# Patient Record
Sex: FEMALE | Race: WHITE | NOT HISPANIC OR LATINO | Employment: FULL TIME
[De-identification: names, ages, dates, MRNs, and addresses within clinical notes are randomized per-mention and may not be internally consistent; named-entity substitution may affect disease eponyms.]

---

## 2022-09-01 ENCOUNTER — TRANSCRIBE ORDERS (OUTPATIENT)
Dept: OTHER | Age: 52
End: 2022-09-01

## 2022-09-01 ENCOUNTER — PATIENT OUTREACH (OUTPATIENT)
Dept: RADIATION ONCOLOGY | Facility: CLINIC | Age: 52
End: 2022-09-01

## 2022-09-01 DIAGNOSIS — C50.919 BREAST CANCER (H): Primary | ICD-10-CM

## 2022-09-01 NOTE — PROGRESS NOTES
Received new patient radiation oncology consultation for pre-surgical consultation from Dr. Patel if possible.  This RN contacted patient, patient currently moving daughter into college and requests return call to patient on Friday, 9/2/2022 to assist in scheduling.    Swetha Cha, RN BSN OCN CBCN

## 2022-09-02 NOTE — TELEPHONE ENCOUNTER
Received return call from patient, patient confirms referral to radiation oncology at [facility].  Patient reports originally upon speaking with the surgical nurse, plan was for pre-surgical consultation, however, patient reports after speaking with her surgeon, she is comfortable with a post-surgical consultation and requests a visit after she has also seen Dr. Robledo in medical oncology.  Patient confirms surgical date of 9/9/2022 for right breast lumpectomy with Dr. Patel and medical oncology consultation with Dr. Robledo on 9/27/2022.  Patient scheduled for new patient radiation oncology consultation with Dr. Tulio Eller on Thursday, 9/29/2022 at 0830 with CT Simulation for radiation treatment planning scheduled at 1015.  Reviewed clinic name, address, check-in desk location and direct contact information for this RN.  Patient denies history of previous radiation treatment, denies placement of cardiac pacemaker/ICD and confirms she will contact her insurance company to verify in-network status.  Patient verbalized understanding of all information, confirmed appointment details and had no questions at this time.    Swetha Cha, RN BSN OCN CBCN

## 2022-09-20 NOTE — TELEPHONE ENCOUNTER
Chart review completed for patient, scheduled for re-excision lumpectomy with Dr. Patel on 9/30/2022.  This RN contacted patient to verify and patient confirms she will be proceeding for additional surgery.  Patient's appointment with Dr. Tulio Eller in radiation oncology currently scheduled for 9/29/2022.  Reviewed rescheduling to post-surgical date and offered appointment on 10/14/2022, 10/18/2022, 10/20/2022 or 10/21/2022.  Patient requests appointment on Tuesday, 10/18/2022 at 0945 for consultation with CT Simulation following at 1100 at [facility].  Patient confirmed appointment details and had no questions at this time.    Swetha Cha, RN BSN OCN CBCN

## 2022-10-18 ENCOUNTER — APPOINTMENT (OUTPATIENT)
Dept: RADIATION ONCOLOGY | Facility: CLINIC | Age: 52
End: 2022-10-18
Payer: COMMERCIAL

## 2022-10-18 ENCOUNTER — OFFICE VISIT (OUTPATIENT)
Dept: RADIATION ONCOLOGY | Facility: CLINIC | Age: 52
End: 2022-10-18
Payer: COMMERCIAL

## 2022-10-18 VITALS
TEMPERATURE: 98.2 F | SYSTOLIC BLOOD PRESSURE: 125 MMHG | RESPIRATION RATE: 18 BRPM | DIASTOLIC BLOOD PRESSURE: 82 MMHG | OXYGEN SATURATION: 99 % | HEART RATE: 92 BPM | WEIGHT: 183.4 LBS

## 2022-10-18 DIAGNOSIS — C50.919 BREAST CANCER (H): ICD-10-CM

## 2022-10-18 PROCEDURE — 77290 THER RAD SIMULAJ FIELD CPLX: CPT | Performed by: RADIOLOGY

## 2022-10-18 PROCEDURE — 99205 OFFICE O/P NEW HI 60 MIN: CPT | Mod: 25 | Performed by: SURGERY

## 2022-10-18 PROCEDURE — 77263 THER RADIOLOGY TX PLNG CPLX: CPT | Performed by: SURGERY

## 2022-10-18 PROCEDURE — 77332 RADIATION TREATMENT AID(S): CPT | Performed by: SURGERY

## 2022-10-18 ASSESSMENT — PAIN SCALES - GENERAL: PAINLEVEL: NO PAIN (0)

## 2022-10-18 NOTE — LETTER
10/18/2022         RE: Kelin Guerra  [address] [facility] [address]        Dear Colleague,    Thank you for referring your patient, Kelin Guerra, to the [facility] RADIATION ONCOLOGY [place]. Please see a copy of my visit note below.       Department of Radiation Oncology  [facility]: [facility] Center  [facility]  [address] [address] [place] [address]  [phone]       Consultation Note    Name: Kelin Guerra MRN: [id number]   : 1970   Date of Service: 10/18/2022  Referring: Dr. Patel     Reason for consultation: RIGHT Breast DCIS    History of Present Illness     Ms. 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 Patel, 22, re-excision for positive margin 22) with pathology demonstrating 8mm DCIS, grade 2, negative margins (initially had positive superior medial margin, and close 1mm margin posteriorly). She will receive endocrine therapy after radiation (Dr. Robledo).     Briefly, her oncologic history as follows:    Patient initially underwent a screening mammogram on 8/3/2022 which identified indeterminate calcifications with asymmetry in the right breast at the 2 o'clock position, middle depth.    He underwent a diagnostic mammogram on 2022 which demonstrated persistent cluster of calcifications measuring up to 7 mm in size at the 3 o'clock position, middle depth.  This is characterized as a BI-RADS-4 lesion.    She underwent biopsy on 2022 with pathology returning as a ductal carcinoma in situ, grade 2, ER positive.    MRI was also obtained of the breast which confirmed foci of calcifications in the right breast without any evidence of lymphadenopathy.  The left breast was negative for any abnormalities.  There was also a indeterminate 8 mm lesion in the liver near the falciform ligament and a liver MRI was recommended for follow-up.      She underwent breast  conservation on 9/9/2022 with Dr. Patel.  Pathology demonstrated ductal carcinoma in situ, measuring 8 mm in extent, grade 2, with presence of central comedonecrosis.  There was initial evidence of a positive superior medial margin with a less than 1 mm focus as well as a close 1 mm posterior margin.    She subsequently underwent reexcision on 9/30/2022.  The right superior medial margin reexcision returned negative for any residual ductal carcinoma .  The right posterior margin reexcision was also negative for any further residual ductal carcinoma in situ.    She is already met with  with plans for endocrine therapy after completion of radiation. She was advised to schedule an MRI of the liver by medical oncology which will be scheduled in the near future.    Today, patient denies any breast tenderness, pain, drainage, fevers, chills, extremity range of motion difficulties, chest pain, dyspnea, or weight loss.     Past Medical History:   Past Medical History:   Diagnosis Date     Ductal carcinoma in situ (DCIS) of right breast 08/04/2022       Past Surgical History:   Past Surgical History:   Procedure Laterality Date     ACHILLES TENDON SURGERY Left 2012     BREAST BIOPSY, CORE RT/LT Right 08/04/2022     BREAST BIOPSY, RT/LT Left 1999    Benign cyst per patient report     BREAST LUMPECTOMY, RT/LT Right 09/09/2022    Dr. Colbert - [facility]     EXTRACTION OF WISDOM TEETH       HERNIA REPAIR      age 5 per patient     HYSTERECTOMY  06/2013     RE-EXCISION LUMPECTOMY RIGHT BREAST Right     Dr. Colbert - [facility]       Chemotherapy History:  Prior chemotherapy    Radiation History:  Prior radiation    Pregnant: No, prior hysterectomy (date 2013 for fibroids and bleeding)   Implanted Cardiac Devices: No    Medications:  Current Outpatient Medications   Medication     Multiple Vitamin (MULTIVITAMIN PO)     No current facility-administered medications for this visit.         Allergies:   No Known  Allergies    Social History:  Tobacco: non smoker  Alcohol: occasional  Employment: works for title company    Family History:  Family History   Problem Relation Age of Onset     Breast Cancer Maternal Grandmother 85     Breast Cancer Maternal Grandfather 91     Ovarian Cancer No family hx of      Prostate Cancer No family hx of        Review of Systems   A 10-point review of systems was performed. Pertinent findings are noted in the HPI.    Physical Exam   ECOG Status: 1    Vitals:  /82 (BP Location: Left arm, Patient Position: Chair, Cuff Size: Adult Regular)   Pulse 92   Temp 98.2  F (36.8  C) (Oral)   Resp 18   Wt 83.2 kg (183 lb 6.4 oz)   SpO2 99%     Gen: Alert, in NAD  Head: NC/AT  Eyes: PERRL, EOMI, sclera anicteric  Ears: No external auricular lesions  Nose/sinus: No rhinorrhea or epistaxis  Breast: RIGHT breast incision healing well, incision CDI, no dehiscence, no upper extremity range of motion limitation     Oral cavity/oropharynx: MMM, no visible oral cavity lesions  Neck: Full ROM, supple, no palpable adenopathy  Pulm: No wheezing, stridor or respiratory distress  CV: Extremities are warm and well-perfused, no cyanosis, no pedal edema  Abdominal: Normal bowel sounds, soft, nontender, no masses  Musculoskeletal: Normal bulk and tone  Skin: Normal color and turgor  Neuro: A/Ox3, CN II-XII intact, normal gait    Imaging/Path/Labs   Imaging: per HPI, personally reviewed and in agreement     Path: per HPI, personally reviewed and in agreement     Labs: per HPI, personally reviewed and in agreement     Assessment      Ms. 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 Patel, 9/9/22, re-excision for positive margin 9/30/22) with pathology demonstrating 8mm DCIS, grade 2, negative margins (initially had positive superior medial margin, and close 1mm margin posteriorly with no residual on re-excision). She will receive endocrine therapy after radiation (Dr. Robledo).  "    We reviewed that the standard of care for the treatment of ductal carcinoma in-situ of the breast in the setting of breast conservation is consideration of adjuvant radiation of the breast. We discussed the etiology of the disease and the overall favorable prognosis of DCIS. In particular, we discussed the benefits of adjuvant radiation therapy would be for reduction in risk of intra-breast tumor recurrences (more DCIS or invasive carcinoma) by approximately 50% without an overall survival benefit (Luis et al, NSABP-17, NEJM 1993, JCO 1998; EBCTG [place]-analysis, [facility], 2010).     With respect to omission of radiation, we discussed the results of RTOG 9804 which randomized women with \"low risk\" DCIS, defined as mammographically detected, <2.5cm, grades 1-2, with surgical margins of at least 3mm, to whole breast irradiation versus observation. While there was an ipsilateral local recurrence benefit to radiation, risk of recurrence with observation is 11% vs 3% with radiation at 12 years.     The options of conventional fractionation versus hypofractionation were reviewed.  Potential options for omission of radiation, and the role of boost were also reviewed.     We discussed the potential acute and delayed toxicities of radiation therapy, as well as the logistics of daily radiation treatment.  The logistics of CT simulation were explained and options for set up, including supine versus prone positioning, and gating were explained.      Plan     1. After extensive discussion, patient wishes to proceed with adjuvant RIGHT whole breast radiation.     2. CT simulation today, with plan to start in 2 weeks. Plan for 40Gy/15fx without boost.     3. Patient has already met with medical oncology (Dr. Robledo) and will receive endocrine therapy after radiation.    All risks and benefits discussed and patient is in agreement with oncologic plan discussed above.     Thank you for allowing me to participate in your " patient's care.  If you should require any additional information, please do not hesitate in contacting me.        Tulio Eller MD  Department of Radiation Oncology  [facility]         Again, thank you for allowing me to participate in the care of your patient.        Sincerely,        uTlio Eller MD

## 2022-10-18 NOTE — PROGRESS NOTES
Department of Radiation Oncology  [facility]    Health: Cancer Center  [facility]  [address] [place] [address]  [phone]       Consultation Note    Name: Kelin Guerra MRN: [id number]   : 1970   Date of Service: 10/18/2022  Referring: Dr. Patel     Reason for consultation: RIGHT Breast DCIS    History of Present Illness     Ms. 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 Patel, 22, re-excision for positive margin 22) with pathology demonstrating 8mm DCIS, grade 2, negative margins (initially had positive superior medial margin, and close 1mm margin posteriorly). She will receive endocrine therapy after radiation (Dr. Robledo).     Briefly, her oncologic history as follows:    Patient initially underwent a screening mammogram on 8/3/2022 which identified indeterminate calcifications with asymmetry in the right breast at the 2 o'clock position, middle depth.    He underwent a diagnostic mammogram on 2022 which demonstrated persistent cluster of calcifications measuring up to 7 mm in size at the 3 o'clock position, middle depth.  This is characterized as a BI-RADS-4 lesion.    She underwent biopsy on 2022 with pathology returning as a ductal carcinoma in situ, grade 2, ER positive.    MRI was also obtained of the breast which confirmed foci of calcifications in the right breast without any evidence of lymphadenopathy.  The left breast was negative for any abnormalities.  There was also a indeterminate 8 mm lesion in the liver near the falciform ligament and a liver MRI was recommended for follow-up.      She underwent breast conservation on 2022 with Dr. Patel.  Pathology demonstrated ductal carcinoma in situ, measuring 8 mm in extent, grade 2, with presence of central comedonecrosis.  There was initial evidence of a positive superior medial margin with a less than 1 mm focus as well as a  close 1 mm posterior margin.    She subsequently underwent reexcision on 9/30/2022.  The right superior medial margin reexcision returned negative for any residual ductal carcinoma .  The right posterior margin reexcision was also negative for any further residual ductal carcinoma in situ.    She is already met with  with plans for endocrine therapy after completion of radiation. She was advised to schedule an MRI of the liver by medical oncology which will be scheduled in the near future.    Today, patient denies any breast tenderness, pain, drainage, fevers, chills, extremity range of motion difficulties, chest pain, dyspnea, or weight loss.     Past Medical History:   Past Medical History:   Diagnosis Date     Ductal carcinoma in situ (DCIS) of right breast 08/04/2022       Past Surgical History:   Past Surgical History:   Procedure Laterality Date     ACHILLES TENDON SURGERY Left 2012     BREAST BIOPSY, CORE RT/LT Right 08/04/2022     BREAST BIOPSY, RT/LT Left 1999    Benign cyst per patient report     BREAST LUMPECTOMY, RT/LT Right 09/09/2022    Dr. Colbert - [facility]     EXTRACTION OF WISDOM TEETH       HERNIA REPAIR      age 5 per patient     HYSTERECTOMY  06/2013     RE-EXCISION LUMPECTOMY RIGHT BREAST Right     Dr. Colbert - [facility]       Chemotherapy History:  Prior chemotherapy    Radiation History:  Prior radiation    Pregnant: No, prior hysterectomy (date 2013 for fibroids and bleeding)   Implanted Cardiac Devices: No    Medications:  Current Outpatient Medications   Medication     Multiple Vitamin (MULTIVITAMIN PO)     No current facility-administered medications for this visit.         Allergies:   No Known Allergies    Social History:  Tobacco: non smoker  Alcohol: occasional  Employment: works for title company    Family History:  Family History   Problem Relation Age of Onset     Breast Cancer Maternal Grandmother 85     Breast Cancer Maternal Grandfather 91     Ovarian  Cancer No family hx of      Prostate Cancer No family hx of        Review of Systems   A 10-point review of systems was performed. Pertinent findings are noted in the HPI.    Physical Exam   ECOG Status: 1    Vitals:  /82 (BP Location: Left arm, Patient Position: Chair, Cuff Size: Adult Regular)   Pulse 92   Temp 98.2  F (36.8  C) (Oral)   Resp 18   Wt 83.2 kg (183 lb 6.4 oz)   SpO2 99%     Gen: Alert, in NAD  Head: NC/AT  Eyes: PERRL, EOMI, sclera anicteric  Ears: No external auricular lesions  Nose/sinus: No rhinorrhea or epistaxis  Breast: RIGHT breast incision healing well, incision CDI, no dehiscence, no upper extremity range of motion limitation     Oral cavity/oropharynx: MMM, no visible oral cavity lesions  Neck: Full ROM, supple, no palpable adenopathy  Pulm: No wheezing, stridor or respiratory distress  CV: Extremities are warm and well-perfused, no cyanosis, no pedal edema  Abdominal: Normal bowel sounds, soft, nontender, no masses  Musculoskeletal: Normal bulk and tone  Skin: Normal color and turgor  Neuro: A/Ox3, CN II-XII intact, normal gait    Imaging/Path/Labs   Imaging: per HPI, personally reviewed and in agreement     Path: per HPI, personally reviewed and in agreement     Labs: per HPI, personally reviewed and in agreement     Assessment      Ms. 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 Patel, 9/9/22, re-excision for positive margin 9/30/22) with pathology demonstrating 8mm DCIS, grade 2, negative margins (initially had positive superior medial margin, and close 1mm margin posteriorly with no residual on re-excision). She will receive endocrine therapy after radiation (Dr. Robledo).     We reviewed that the standard of care for the treatment of ductal carcinoma in-situ of the breast in the setting of breast conservation is consideration of adjuvant radiation of the breast. We discussed the etiology of the disease and the overall favorable prognosis  "of DCIS. In particular, we discussed the benefits of adjuvant radiation therapy would be for reduction in risk of intra-breast tumor recurrences (more DCIS or invasive carcinoma) by approximately 50% without an overall survival benefit (Smith et al, NSABP-17, NEJM 1993, JCO 1998; EBCTG [place]-analysis, [facility], 2010).     With respect to omission of radiation, we discussed the results of RTOG 9804 which randomized women with \"low risk\" DCIS, defined as mammographically detected, <2.5cm, grades 1-2, with surgical margins of at least 3mm, to whole breast irradiation versus observation. While there was an ipsilateral local recurrence benefit to radiation, risk of recurrence with observation is 11% vs 3% with radiation at 12 years.     The options of conventional fractionation versus hypofractionation were reviewed.  Potential options for omission of radiation, and the role of boost were also reviewed.     We discussed the potential acute and delayed toxicities of radiation therapy, as well as the logistics of daily radiation treatment.  The logistics of CT simulation were explained and options for set up, including supine versus prone positioning, and gating were explained.      Plan     1. After extensive discussion, patient wishes to proceed with adjuvant RIGHT whole breast radiation.     2. CT simulation today, with plan to start in 2 weeks. Plan for 40Gy/15fx without boost.     3. Patient has already met with medical oncology (Dr. Robledo) and will receive endocrine therapy after radiation.    All risks and benefits discussed and patient is in agreement with oncologic plan discussed above.     Thank you for allowing me to participate in your patient's care.  If you should require any additional information, please do not hesitate in contacting me.        Tulio Eller MD  Department of Radiation Oncology  [facility]     "

## 2022-10-18 NOTE — NURSING NOTE
INITIAL PATIENT ASSESSMENT      Diagnosis: DCIS (right breast)    Prior radiation therapy: None    Prior chemotherapy: None        Pain Eval:  Denies    Psychosocial  Living arrangements: lives at home in [place], patient reports she works for a [employer] company  Fall Risk: independent  [facility] Falls Risk Screening Completed: Yes Result: Negative   referral needs: Not needed    Advanced Directive: No, patient reports she has information packet  Implantable Cardiac Device: No  Authorization To Share Protected Health Information: YES - Date: 10/18/2022      Onset of menopause: patient reports s/p hysterectomy in 6/2013.  Abnormal vaginal bleeding/discharge: No  Are you pregnant? No  Urine Pregnancy Testing Needed: No, s/p hysterectomy      Review of Systems     Constitutional: Negative.    HENT: Negative.    Eyes: Negative.    Respiratory: Negative.    Cardiovascular: Negative.    Gastrointestinal: Negative.    Genitourinary: Negative.    Musculoskeletal: Negative.    Skin: Negative.    Neurological: Negative.    Endo/Heme/Allergies: Negative.    Psychiatric/Behavioral: Negative.      Nurse face-to-face time: Level 5:  over 15 min face to face time.    Swetha Cha RN BSN OCN CBCN

## 2022-10-18 NOTE — PATIENT INSTRUCTIONS
What to expect at your Simulation visit:    You will meet with a Radiation Therapist and other team members who will be doing a planning session called a  simulation  with you. This process will determine your daily treatment.    ~ You will lie on a flat table and have a treatment planning CT scan.  It is important during the scan to hold very still and breathe normally.    ~ Your therapist may construct a body mold to help you hold still for your treatments.    ~ If you are having treatment to the head or neck area you will be fitted with a plastic mesh mask that fits very snugly over your face and neck.     ~ Your therapist will be taking some digital photos that will go in your treatment chart.      ~Your therapist will make marks on your skin and take measurements. Your therapist may ask you about making small tattoos (a permanent small dot) over these marks.  These marks are used to position you daily for your radiation therapy treatments. Please do not wash off any marks until all of your radiation therapy treatments are complete unless you are instructed to do so by your therapist.    ~ Once the simulation is completed it can take from 3 to 10 business days before you start radiation therapy treatments.    ~ You may meet with a nurse who will go over management of treatment side effects and self care during your treatments. The nurse will help to plan care with other departments and physicians if needed.         Please contact [facility] RN with questions or concerns following today's appointment: [phone].    Thank you!

## 2022-11-01 ENCOUNTER — APPOINTMENT (OUTPATIENT)
Dept: RADIATION ONCOLOGY | Facility: CLINIC | Age: 52
End: 2022-11-01
Payer: COMMERCIAL

## 2022-11-01 PROCEDURE — 77300 RADIATION THERAPY DOSE PLAN: CPT | Performed by: SURGERY

## 2022-11-01 PROCEDURE — 77334 RADIATION TREATMENT AID(S): CPT | Performed by: SURGERY

## 2022-11-01 PROCEDURE — 77295 3-D RADIOTHERAPY PLAN: CPT | Performed by: SURGERY

## 2022-11-02 ENCOUNTER — APPOINTMENT (OUTPATIENT)
Dept: RADIATION ONCOLOGY | Facility: CLINIC | Age: 52
End: 2022-11-02
Payer: COMMERCIAL

## 2022-11-02 ENCOUNTER — ALLIED HEALTH/NURSE VISIT (OUTPATIENT)
Dept: RADIATION ONCOLOGY | Facility: CLINIC | Age: 52
End: 2022-11-02
Payer: COMMERCIAL

## 2022-11-02 DIAGNOSIS — C50.919 BREAST CANCER (H): Primary | ICD-10-CM

## 2022-11-02 PROCEDURE — 77280 THER RAD SIMULAJ FIELD SMPL: CPT | Performed by: SURGERY

## 2022-11-02 PROCEDURE — 99207 PR NO CHARGE NURSE ONLY: CPT

## 2022-11-02 NOTE — PATIENT INSTRUCTIONS
Please contact [facility] RN with questions or concerns following today's appointment: [phone].    Thank you!

## 2022-11-02 NOTE — NURSING NOTE
Teaching Flowsheet   Relevant Diagnosis: breast cancer  Teaching Topic: radiation therapy     Person(s) involved in teaching:   Patient     Motivation Level:  Asks Questions: Yes  Eager to Learn: Yes  Cooperative: Yes  Receptive (willing/able to accept information): Yes  Any cultural factors/[place] beliefs that may influence understanding or compliance? No       Patient demonstrates understanding of the following:  Reason for the appointment, diagnosis and treatment plan: Yes  Knowledge of proper use of medications and conditions for which they are ordered (with special attention to potential side effects or drug interactions): Yes  Which situations necessitate calling provider and whom to contact: Yes       Teaching Concerns Addressed:   Comments: radiation therapy side effects: fatigue, skin changes and skin cares     Proper use and care of  (medical equip, care aids, etc.): NA  Nutritional needs and diet plan: Yes  Pain management techniques: Yes  Wound Care: Yes  How and/when to access community resources: Yes     Instructional Materials Used/Given: Radiation therapy educational handouts: fatigue, skin changes and skin cares     Time spent with patient: 15 minutes.    Swetha Cha RN BSN OCN CBCN

## 2022-11-03 ENCOUNTER — APPOINTMENT (OUTPATIENT)
Dept: RADIATION ONCOLOGY | Facility: CLINIC | Age: 52
End: 2022-11-03
Payer: COMMERCIAL

## 2022-11-03 PROCEDURE — 77412 RADIATION TX DELIVERY LVL 3: CPT | Performed by: SURGERY

## 2022-11-04 ENCOUNTER — APPOINTMENT (OUTPATIENT)
Dept: RADIATION ONCOLOGY | Facility: CLINIC | Age: 52
End: 2022-11-04
Payer: COMMERCIAL

## 2022-11-04 PROCEDURE — 77412 RADIATION TX DELIVERY LVL 3: CPT | Performed by: SURGERY

## 2022-11-07 ENCOUNTER — APPOINTMENT (OUTPATIENT)
Dept: RADIATION ONCOLOGY | Facility: CLINIC | Age: 52
End: 2022-11-07
Payer: COMMERCIAL

## 2022-11-07 PROCEDURE — 77412 RADIATION TX DELIVERY LVL 3: CPT | Performed by: RADIOLOGY

## 2022-11-08 ENCOUNTER — APPOINTMENT (OUTPATIENT)
Dept: RADIATION ONCOLOGY | Facility: CLINIC | Age: 52
End: 2022-11-08
Payer: COMMERCIAL

## 2022-11-08 PROCEDURE — 77417 THER RADIOLOGY PORT IMAGE(S): CPT | Performed by: SURGERY

## 2022-11-08 PROCEDURE — 77412 RADIATION TX DELIVERY LVL 3: CPT | Performed by: SURGERY

## 2022-11-09 ENCOUNTER — APPOINTMENT (OUTPATIENT)
Dept: RADIATION ONCOLOGY | Facility: CLINIC | Age: 52
End: 2022-11-09
Payer: COMMERCIAL

## 2022-11-09 ENCOUNTER — OFFICE VISIT (OUTPATIENT)
Dept: RADIATION ONCOLOGY | Facility: CLINIC | Age: 52
End: 2022-11-09
Payer: COMMERCIAL

## 2022-11-09 VITALS
OXYGEN SATURATION: 97 % | SYSTOLIC BLOOD PRESSURE: 118 MMHG | HEART RATE: 92 BPM | DIASTOLIC BLOOD PRESSURE: 80 MMHG | RESPIRATION RATE: 18 BRPM | TEMPERATURE: 98.4 F | WEIGHT: 183 LBS

## 2022-11-09 DIAGNOSIS — D05.11 DUCTAL CARCINOMA IN SITU (DCIS) OF RIGHT BREAST: Primary | ICD-10-CM

## 2022-11-09 PROCEDURE — 77336 RADIATION PHYSICS CONSULT: CPT | Performed by: SURGERY

## 2022-11-09 PROCEDURE — 77427 RADIATION TX MANAGEMENT X5: CPT | Performed by: SURGERY

## 2022-11-09 PROCEDURE — 77412 RADIATION TX DELIVERY LVL 3: CPT | Performed by: SURGERY

## 2022-11-09 PROCEDURE — 99207 PR DROP WITH A PROCEDURE: CPT | Performed by: SURGERY

## 2022-11-09 ASSESSMENT — PAIN SCALES - GENERAL: PAINLEVEL: NO PAIN (0)

## 2022-11-09 NOTE — LETTER
2022         RE: Kelin Guerra  [address] [facility] [address]        Dear Colleague,    Thank you for referring your patient, Kelin Guerra, to the [facility] RADIATION ONCOLOGY [place]. Please see a copy of my visit note below.    [facility] PHYSICIANS  SPECIALIZING IN BREAKTHROUGHS  Radiation Oncology    On Treatment Visit Note      Kelin Guerra      Date: 2022   MRN: [id number]   : 1970  Diagnosis: DCIS ER (+)        ID: Ms. 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 Patel, 22, re-excision for positive margin 22) with pathology demonstrating 8mm DCIS, grade 2, negative margins (initially had positive superior medial margin, and close 1mm margin posteriorly with no residual on re-excision). She will receive endocrine therapy after radiation (Dr. Robledo). Plan for 40Gy/15fx without boost.     Reason for Visit:  On Radiation Treatment Visit       Treatment Summary to Date  Treatment Site: right breast Current Dose: 1335/4005 cGy Fractions: 5/15      Chemotherapy  Chemo concurrent with radx?: No (Dr. Robledo)    Subjective:   No complaints, tolerating RT well overall.    Nursing ROS:   Nutrition Alteration  Diet Type: Patient's Preference  Skin  Skin Reaction: 0 - No changes  Skin Intervention: patient applying Aquaphor two times daily        Cardiovascular  Respiratory effort: 1 - Normal - without distress        Psychosocial  Mood - Anxiety: 0 - Normal  Mood - Depression: 0 - Normal  Psychosocial Note: energy level at baseline  Pain Assessment  0-10 Pain Scale: 0      Objective:   /80 (BP Location: Left arm, Patient Position: Chair, Cuff Size: Adult Regular)   Pulse 92   Temp 98.4  F (36.9  C) (Oral)   Resp 18   Wt 83 kg (183 lb)   SpO2 97%   Gen: Appears well, in no acute distress  Skin: faint erythema, no skin breakdown   CV/Resp: rrr, breathing comfortably on room air  Neuro: CN 2-12 grossly  intact, UE/LE full strength     Labs:  CBC RESULTS: No results for input(s): WBC, RBC, HGB, HCT, MCV, MCH, MCHC, RDW, PLT in the last 52161 hours.  ELECTROLYTES:  No results for input(s): NA, POTASSIUM, CHLORIDE, EMILY, CO2, BUN, CR, GLC in the last 01676 hours.    Assessment:    Tolerating radiation therapy well.  All questions and concerns addressed.      Plan:   1. Continue current therapy.        Mosaiq chart and setup information reviewed  Ports checked and MVCT/IGRT images checked    Medication Review  Med list reviewed with patient?: Yes  Med list printed and given: Offered and declined    Educational Topic Discussed  Education Instructions: radiation therapy side effects reviewed: fatigue, skin changes and skin cares    Tulio Eller MD  Radiation Oncology   [facility]: [phone]            Again, thank you for allowing me to participate in the care of your patient.        Sincerely,        Tulio Eller MD

## 2022-11-09 NOTE — PATIENT INSTRUCTIONS
Please contact [facility] RN with questions or concerns following today's appointment: [phone].    Thank you!

## 2022-11-09 NOTE — PROGRESS NOTES
[facility] PHYSICIANS  SPECIALIZING IN BREAKTHROUGHS  Radiation Oncology    On Treatment Visit Note      Kelin Guerra      Date: 2022   MRN: [id number]   : 1970  Diagnosis: DCIS ER (+)        ID: Ms. 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 Patel, 22, re-excision for positive margin 22) with pathology demonstrating 8mm DCIS, grade 2, negative margins (initially had positive superior medial margin, and close 1mm margin posteriorly with no residual on re-excision). She will receive endocrine therapy after radiation (Dr. Robledo). Plan for 40Gy/15fx without boost.     Reason for Visit:  On Radiation Treatment Visit       Treatment Summary to Date  Treatment Site: right breast Current Dose: 1335/4005 cGy Fractions: 5/15      Chemotherapy  Chemo concurrent with radx?: No (Dr. Robledo)    Subjective:   No complaints, tolerating RT well overall.    Nursing ROS:   Nutrition Alteration  Diet Type: Patient's Preference  Skin  Skin Reaction: 0 - No changes  Skin Intervention: patient applying Aquaphor two times daily        Cardiovascular  Respiratory effort: 1 - Normal - without distress        Psychosocial  Mood - Anxiety: 0 - Normal  Mood - Depression: 0 - Normal  Psychosocial Note: energy level at baseline  Pain Assessment  0-10 Pain Scale: 0      Objective:   /80 (BP Location: Left arm, Patient Position: Chair, Cuff Size: Adult Regular)   Pulse 92   Temp 98.4  F (36.9  C) (Oral)   Resp 18   Wt 83 kg (183 lb)   SpO2 97%   Gen: Appears well, in no acute distress  Skin: faint erythema, no skin breakdown   CV/Resp: rrr, breathing comfortably on room air  Neuro: CN 2-12 grossly intact, UE/LE full strength     Labs:  CBC RESULTS: No results for input(s): WBC, RBC, HGB, HCT, MCV, MCH, MCHC, RDW, PLT in the last 18187 hours.  ELECTROLYTES:  No results for input(s): NA, POTASSIUM, CHLORIDE, EMILY, CO2, BUN, CR, GLC in the last 46336  hours.    Assessment:    Tolerating radiation therapy well.  All questions and concerns addressed.      Plan:   1. Continue current therapy.        Mosaiq chart and setup information reviewed  Ports checked and MVCT/IGRT images checked    Medication Review  Med list reviewed with patient?: Yes  Med list printed and given: Offered and declined    Educational Topic Discussed  Education Instructions: radiation therapy side effects reviewed: fatigue, skin changes and skin cares    Tulio Eller MD  Radiation Oncology   [facility]  Clinic: [phone]

## 2022-11-10 ENCOUNTER — APPOINTMENT (OUTPATIENT)
Dept: RADIATION ONCOLOGY | Facility: CLINIC | Age: 52
End: 2022-11-10
Payer: COMMERCIAL

## 2022-11-10 PROCEDURE — 77412 RADIATION TX DELIVERY LVL 3: CPT | Performed by: SURGERY

## 2022-11-11 ENCOUNTER — APPOINTMENT (OUTPATIENT)
Dept: RADIATION ONCOLOGY | Facility: CLINIC | Age: 52
End: 2022-11-11
Payer: COMMERCIAL

## 2022-11-11 PROCEDURE — 77412 RADIATION TX DELIVERY LVL 3: CPT | Performed by: SURGERY

## 2022-11-14 ENCOUNTER — APPOINTMENT (OUTPATIENT)
Dept: RADIATION ONCOLOGY | Facility: CLINIC | Age: 52
End: 2022-11-14
Payer: COMMERCIAL

## 2022-11-14 PROCEDURE — 77412 RADIATION TX DELIVERY LVL 3: CPT | Performed by: RADIOLOGY

## 2022-11-14 PROCEDURE — 77417 THER RADIOLOGY PORT IMAGE(S): CPT | Performed by: RADIOLOGY

## 2022-11-15 ENCOUNTER — APPOINTMENT (OUTPATIENT)
Dept: RADIATION ONCOLOGY | Facility: CLINIC | Age: 52
End: 2022-11-15
Payer: COMMERCIAL

## 2022-11-15 PROCEDURE — 77412 RADIATION TX DELIVERY LVL 3: CPT | Performed by: SURGERY

## 2022-11-17 ENCOUNTER — APPOINTMENT (OUTPATIENT)
Dept: RADIATION ONCOLOGY | Facility: CLINIC | Age: 52
End: 2022-11-17
Payer: COMMERCIAL

## 2022-11-17 ENCOUNTER — OFFICE VISIT (OUTPATIENT)
Dept: RADIATION ONCOLOGY | Facility: CLINIC | Age: 52
End: 2022-11-17
Payer: COMMERCIAL

## 2022-11-17 VITALS
OXYGEN SATURATION: 97 % | RESPIRATION RATE: 18 BRPM | SYSTOLIC BLOOD PRESSURE: 125 MMHG | WEIGHT: 183 LBS | DIASTOLIC BLOOD PRESSURE: 83 MMHG | TEMPERATURE: 98.3 F | HEART RATE: 81 BPM

## 2022-11-17 DIAGNOSIS — D05.11 DUCTAL CARCINOMA IN SITU (DCIS) OF RIGHT BREAST: Primary | ICD-10-CM

## 2022-11-17 PROCEDURE — 77427 RADIATION TX MANAGEMENT X5: CPT | Performed by: SURGERY

## 2022-11-17 PROCEDURE — 77336 RADIATION PHYSICS CONSULT: CPT | Performed by: SURGERY

## 2022-11-17 PROCEDURE — 77412 RADIATION TX DELIVERY LVL 3: CPT | Performed by: SURGERY

## 2022-11-17 ASSESSMENT — PAIN SCALES - GENERAL: PAINLEVEL: NO PAIN (0)

## 2022-11-17 NOTE — PATIENT INSTRUCTIONS
Please contact [facility] RN with questions or concerns following today's appointment: [phone].    Thank you!

## 2022-11-17 NOTE — LETTER
2022         RE: Kelin Guerra  [address] [facility] [address]        Dear Colleague,    Thank you for referring your patient, Kelin Guerra, to the [facility] RADIATION ONCOLOGY [place]. Please see a copy of my visit note below.    [facility] PHYSICIANS  SPECIALIZING IN BREAKTHROUGHS  Radiation Oncology    On Treatment Visit Note      Kelin Guerra      Date: 2022   MRN: [id number]   : 1970  Diagnosis: DCIS ER (+)        ID: Ms. 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 Patel, 22, re-excision for positive margin 22) with pathology demonstrating 8mm DCIS, grade 2, negative margins (initially had positive superior medial margin, and close 1mm margin posteriorly with no residual on re-excision). She will receive endocrine therapy after radiation (Dr. Robledo). Plan for 40Gy/15fx without boost.     Reason for Visit:  On Radiation Treatment Visit       Treatment Summary to Date  Treatment Site: right breast Current Dose: 2670/4005 cGy Fractions: 10/15      Chemotherapy  Chemo concurrent with radx?: No (Dr. Robledo)     Subjective:   No complaints, tolerating RT well overall.     Nursing ROS:   Nutrition Alteration  Diet Type: Patient's Preference  Skin  Skin Reaction: 1 - Faint erythema or dry desquamation  Skin Intervention: patient applying Aquaphor two times daily        Cardiovascular  Respiratory effort: 1 - Normal - without distress        Psychosocial  Mood - Anxiety: 0 - Normal  Mood - Depression: 0 - Normal  Psychosocial Note: energy level at baseline  Pain Assessment  0-10 Pain Scale: 0      Objective:   /83 (BP Location: Left arm, Patient Position: Chair, Cuff Size: Adult Regular)   Pulse 81   Temp 98.3  F (36.8  C) (Oral)   Resp 18   Wt 83 kg (183 lb)   SpO2 97%   Gen: Appears well, in no acute distress  Skin: mild erythema, no skin breakdown   CV/Resp: rrr, breathing comfortably on room  air  Neuro: CN 2-12 grossly intact, UE/LE full strength     Labs:  CBC RESULTS: No results for input(s): WBC, RBC, HGB, HCT, MCV, MCH, MCHC, RDW, PLT in the last 82529 hours.  ELECTROLYTES:  No results for input(s): NA, POTASSIUM, CHLORIDE, EMILY, CO2, BUN, CR, GLC in the last 46499 hours.    Assessment:    Tolerating radiation therapy well.  All questions and concerns addressed.      Plan:   1. Continue current therapy.        Mosaiq chart and setup information reviewed  Ports checked and MVCT/IGRT images checked    Medication Review  Med list reviewed with patient?: Yes  Med list printed and given: Offered and declined    Educational Topic Discussed  Education Instructions: radiation therapy side effects reviewed: fatigue, skin changes and skin cares    Tulio Eller MD  Radiation Oncology   [facility]  Clinic: [phone]            Again, thank you for allowing me to participate in the care of your patient.        Sincerely,        Tulio Eller MD

## 2022-11-17 NOTE — PROGRESS NOTES
[facility] PHYSICIANS  SPECIALIZING IN BREAKTHROUGHS  Radiation Oncology    On Treatment Visit Note      Kelin Guerra      Date: 2022   MRN: [id number]   : 1970  Diagnosis: DCIS ER (+)        ID: Ms. 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 Patel, 22, re-excision for positive margin 22) with pathology demonstrating 8mm DCIS, grade 2, negative margins (initially had positive superior medial margin, and close 1mm margin posteriorly with no residual on re-excision). She will receive endocrine therapy after radiation (Dr. Robledo). Plan for 40Gy/15fx without boost.     Reason for Visit:  On Radiation Treatment Visit       Treatment Summary to Date  Treatment Site: right breast Current Dose: 2670/4005 cGy Fractions: 10/15      Chemotherapy  Chemo concurrent with radx?: No (Dr. Robledo)     Subjective:   No complaints, tolerating RT well overall.     Nursing ROS:   Nutrition Alteration  Diet Type: Patient's Preference  Skin  Skin Reaction: 1 - Faint erythema or dry desquamation  Skin Intervention: patient applying Aquaphor two times daily        Cardiovascular  Respiratory effort: 1 - Normal - without distress        Psychosocial  Mood - Anxiety: 0 - Normal  Mood - Depression: 0 - Normal  Psychosocial Note: energy level at baseline  Pain Assessment  0-10 Pain Scale: 0      Objective:   /83 (BP Location: Left arm, Patient Position: Chair, Cuff Size: Adult Regular)   Pulse 81   Temp 98.3  F (36.8  C) (Oral)   Resp 18   Wt 83 kg (183 lb)   SpO2 97%   Gen: Appears well, in no acute distress  Skin: mild erythema, no skin breakdown   CV/Resp: rrr, breathing comfortably on room air  Neuro: CN 2-12 grossly intact, UE/LE full strength     Labs:  CBC RESULTS: No results for input(s): WBC, RBC, HGB, HCT, MCV, MCH, MCHC, RDW, PLT in the last 67636 hours.  ELECTROLYTES:  No results for input(s): NA, POTASSIUM, CHLORIDE, EMILY, CO2, BUN, CR, GLC  in the last 66199 hours.    Assessment:    Tolerating radiation therapy well.  All questions and concerns addressed.      Plan:   1. Continue current therapy.        Mosaiq chart and setup information reviewed  Ports checked and MVCT/IGRT images checked    Medication Review  Med list reviewed with patient?: Yes  Med list printed and given: Offered and declined    Educational Topic Discussed  Education Instructions: radiation therapy side effects reviewed: fatigue, skin changes and skin cares    Tulio Eller MD  Radiation Oncology   [facility]  Clinic: [phone]

## 2022-11-18 ENCOUNTER — APPOINTMENT (OUTPATIENT)
Dept: RADIATION ONCOLOGY | Facility: CLINIC | Age: 52
End: 2022-11-18
Payer: COMMERCIAL

## 2022-11-18 PROCEDURE — 77412 RADIATION TX DELIVERY LVL 3: CPT | Performed by: SURGERY

## 2022-11-21 ENCOUNTER — APPOINTMENT (OUTPATIENT)
Dept: RADIATION ONCOLOGY | Facility: CLINIC | Age: 52
End: 2022-11-21
Payer: COMMERCIAL

## 2022-11-21 PROCEDURE — 77412 RADIATION TX DELIVERY LVL 3: CPT | Performed by: RADIOLOGY

## 2022-11-22 ENCOUNTER — APPOINTMENT (OUTPATIENT)
Dept: RADIATION ONCOLOGY | Facility: CLINIC | Age: 52
End: 2022-11-22
Payer: COMMERCIAL

## 2022-11-22 PROCEDURE — 77417 THER RADIOLOGY PORT IMAGE(S): CPT | Performed by: SURGERY

## 2022-11-22 PROCEDURE — 77412 RADIATION TX DELIVERY LVL 3: CPT | Performed by: SURGERY

## 2022-11-23 ENCOUNTER — OFFICE VISIT (OUTPATIENT)
Dept: RADIATION ONCOLOGY | Facility: CLINIC | Age: 52
End: 2022-11-23
Payer: COMMERCIAL

## 2022-11-23 ENCOUNTER — APPOINTMENT (OUTPATIENT)
Dept: RADIATION ONCOLOGY | Facility: CLINIC | Age: 52
End: 2022-11-23
Payer: COMMERCIAL

## 2022-11-23 VITALS
OXYGEN SATURATION: 100 % | DIASTOLIC BLOOD PRESSURE: 87 MMHG | TEMPERATURE: 98 F | RESPIRATION RATE: 18 BRPM | SYSTOLIC BLOOD PRESSURE: 121 MMHG | WEIGHT: 183 LBS | HEART RATE: 83 BPM

## 2022-11-23 DIAGNOSIS — D05.11 DUCTAL CARCINOMA IN SITU (DCIS) OF RIGHT BREAST: Primary | ICD-10-CM

## 2022-11-23 PROCEDURE — 77412 RADIATION TX DELIVERY LVL 3: CPT | Performed by: SURGERY

## 2022-11-23 PROCEDURE — 99207 PR DROP WITH A PROCEDURE: CPT | Performed by: SURGERY

## 2022-11-23 ASSESSMENT — PAIN SCALES - GENERAL: PAINLEVEL: MILD PAIN (3)

## 2022-11-23 NOTE — LETTER
2022         RE: Kelin Guerra  [address] [facility] [address]        Dear Colleague,    Thank you for referring your patient, Kelin Guerra, to the [facility] RADIATION ONCOLOGY [place]. Please see a copy of my visit note below.    [facility] PHYSICIANS  SPECIALIZING IN BREAKTHROUGHS  Radiation Oncology    On Treatment Visit Note      Kelin Guerra      Date: 2022   MRN: [id number]   : 1970  Diagnosis: DCIS ER (+)        ID:  Ms. 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 Patel, 22, re-excision for positive margin 22) with pathology demonstrating 8mm DCIS, grade 2, negative margins (initially had positive superior medial margin, and close 1mm margin posteriorly with no residual on re-excision). She will receive endocrine therapy after radiation (Dr. Robledo). Plan for 40Gy/15fx without boost.      Reason for Visit:  On Radiation Treatment Visit       Treatment Summary to Date  Treatment Site: right breast Current Dose: 3738/4005 cGy Fractions: 14/15      Chemotherapy  Chemo concurrent with radx?: No (Dr. Robledo)    Subjective:   No complaints, tolerating RT well overall. Pruritis.     Nursing ROS:   Nutrition Alteration  Diet Type: Patient's Preference  Skin  Skin Reaction: 2 - Moderate to brisk erythema, patchy moist desquamation, mostly confined to skin folds and creases, moderate edema (no desquamation)  Skin Intervention: patient applying Aquaphor two times daily  Skin Note: pruritic rash of right mid chest, reviewed use of Hydrocortisone 1% cream as needed        Cardiovascular  Respiratory effort: 1 - Normal - without distress        Psychosocial  Mood - Anxiety: 0 - Normal  Mood - Depression: 0 - Normal  Psychosocial Note: energy level at baseline  Pain Assessment  0-10 Pain Scale: 0      Objective:   /87 (BP Location: Left arm, Patient Position: Chair, Cuff Size: Adult Regular)   Pulse 83    Temp 98  F (36.7  C) (Oral)   Resp 18   Wt 83 kg (183 lb)   SpO2 100%   Gen: Appears well, in no acute distress  Skin: mild to moderate erythema, no skin breakdown  CV/Resp: rrr, breathing comfortably on room air  Neuro: CN 2-12 grossly intact, UE/LE full strength     Labs:  CBC RESULTS: No results for input(s): WBC, RBC, HGB, HCT, MCV, MCH, MCHC, RDW, PLT in the last 20824 hours.  ELECTROLYTES:  No results for input(s): NA, POTASSIUM, CHLORIDE, EMILY, CO2, BUN, CR, GLC in the last 49445 hours.    Assessment:    Tolerating radiation therapy well.  All questions and concerns addressed.      Plan:   1. Continue current therapy.    2. Topical Steroid Cream (OTC)  3. Aquaphor TID  4. Follow up in radiation in 1 month post completion of radiation   5. Follow up with Dr. Robledo for endocrine therapy       Mosaiq chart and setup information reviewed  Ports checked and MVCT/IGRT images checked    Medication Review  Med list reviewed with patient?: Yes  Med list printed and given: Offered and declined    Educational Topic Discussed  Additional Instructions: follow-up with Dr. Eller scheduled 12/23/2022  Education Instructions: reviewed continued management of radiation therapy side effects    Tulio Eller MD  Radiation Oncology   [facility]  Clinic: [phone]            Again, thank you for allowing me to participate in the care of your patient.        Sincerely,        Tulio Eller MD

## 2022-11-23 NOTE — PROGRESS NOTES
[facility] PHYSICIANS  SPECIALIZING IN BREAKTHROUGHS  Radiation Oncology    On Treatment Visit Note      Kelin Guerra      Date: 2022   MRN: [id number]   : 1970  Diagnosis: DCIS ER (+)        ID:  Ms. 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 Patel, 22, re-excision for positive margin 22) with pathology demonstrating 8mm DCIS, grade 2, negative margins (initially had positive superior medial margin, and close 1mm margin posteriorly with no residual on re-excision). She will receive endocrine therapy after radiation (Dr. Robledo). Plan for 40Gy/15fx without boost.      Reason for Visit:  On Radiation Treatment Visit       Treatment Summary to Date  Treatment Site: right breast Current Dose: 3738/4005 cGy Fractions: 14/15      Chemotherapy  Chemo concurrent with radx?: No (Dr. Robledo)    Subjective:   No complaints, tolerating RT well overall. Pruritis.     Nursing ROS:   Nutrition Alteration  Diet Type: Patient's Preference  Skin  Skin Reaction: 2 - Moderate to brisk erythema, patchy moist desquamation, mostly confined to skin folds and creases, moderate edema (no desquamation)  Skin Intervention: patient applying Aquaphor two times daily  Skin Note: pruritic rash of right mid chest, reviewed use of Hydrocortisone 1% cream as needed        Cardiovascular  Respiratory effort: 1 - Normal - without distress        Psychosocial  Mood - Anxiety: 0 - Normal  Mood - Depression: 0 - Normal  Psychosocial Note: energy level at baseline  Pain Assessment  0-10 Pain Scale: 0      Objective:   /87 (BP Location: Left arm, Patient Position: Chair, Cuff Size: Adult Regular)   Pulse 83   Temp 98  F (36.7  C) (Oral)   Resp 18   Wt 83 kg (183 lb)   SpO2 100%   Gen: Appears well, in no acute distress  Skin: mild to moderate erythema, no skin breakdown  CV/Resp: rrr, breathing comfortably on room air  Neuro: CN 2-12 grossly intact, UE/LE full  strength     Labs:  CBC RESULTS: No results for input(s): WBC, RBC, HGB, HCT, MCV, MCH, MCHC, RDW, PLT in the last 43565 hours.  ELECTROLYTES:  No results for input(s): NA, POTASSIUM, CHLORIDE, EMILY, CO2, BUN, CR, GLC in the last 86108 hours.    Assessment:    Tolerating radiation therapy well.  All questions and concerns addressed.      Plan:   1. Continue current therapy.    2. Topical Steroid Cream (OTC)  3. Aquaphor TID  4. Follow up in radiation in 1 month post completion of radiation   5. Follow up with Dr. Robledo for endocrine therapy       Mosaiq chart and setup information reviewed  Ports checked and MVCT/IGRT images checked    Medication Review  Med list reviewed with patient?: Yes  Med list printed and given: Offered and declined    Educational Topic Discussed  Additional Instructions: follow-up with Dr. Eller scheduled 12/23/2022  Education Instructions: reviewed continued management of radiation therapy side effects    Tulio Eller MD  Radiation Oncology   [facility]  Clinic: [phone]

## 2022-11-23 NOTE — PATIENT INSTRUCTIONS
Please contact [facility] RN with questions or concerns following today's appointment: [phone].    Thank you!

## 2022-11-25 ENCOUNTER — DOCUMENTATION ONLY (OUTPATIENT)
Dept: RADIATION ONCOLOGY | Facility: CLINIC | Age: 52
End: 2022-11-25

## 2022-11-25 ENCOUNTER — APPOINTMENT (OUTPATIENT)
Dept: RADIATION ONCOLOGY | Facility: CLINIC | Age: 52
End: 2022-11-25
Payer: COMMERCIAL

## 2022-11-25 DIAGNOSIS — D05.11 DUCTAL CARCINOMA IN SITU (DCIS) OF RIGHT BREAST: Primary | ICD-10-CM

## 2022-11-25 PROCEDURE — 77336 RADIATION PHYSICS CONSULT: CPT | Performed by: SURGERY

## 2022-11-25 PROCEDURE — 77427 RADIATION TX MANAGEMENT X5: CPT | Performed by: SURGERY

## 2022-11-25 PROCEDURE — 77412 RADIATION TX DELIVERY LVL 3: CPT | Performed by: STUDENT IN AN ORGANIZED HEALTH CARE EDUCATION/TRAINING PROGRAM

## 2022-12-23 ENCOUNTER — OFFICE VISIT (OUTPATIENT)
Dept: RADIATION ONCOLOGY | Facility: CLINIC | Age: 52
End: 2022-12-23
Payer: COMMERCIAL

## 2022-12-23 VITALS
WEIGHT: 183 LBS | TEMPERATURE: 98.8 F | OXYGEN SATURATION: 97 % | RESPIRATION RATE: 18 BRPM | HEART RATE: 89 BPM | SYSTOLIC BLOOD PRESSURE: 112 MMHG | DIASTOLIC BLOOD PRESSURE: 78 MMHG

## 2022-12-23 DIAGNOSIS — D05.11 DUCTAL CARCINOMA IN SITU (DCIS) OF RIGHT BREAST: Primary | ICD-10-CM

## 2022-12-23 PROCEDURE — 99024 POSTOP FOLLOW-UP VISIT: CPT | Performed by: SURGERY

## 2022-12-23 RX ORDER — TAMOXIFEN CITRATE 20 MG/1
20 TABLET ORAL DAILY
COMMUNITY
Start: 2022-11-17 | End: 2024-04-17

## 2022-12-23 ASSESSMENT — PAIN SCALES - GENERAL: PAINLEVEL: NO PAIN (0)

## 2022-12-23 NOTE — PATIENT INSTRUCTIONS
Please contact [facility] RN with questions or concerns following today's appointment: [phone].    Thank you!

## 2022-12-23 NOTE — LETTER
2022         RE: Kelin Guerra  [address] [facility] [address]        Dear Colleague,    Thank you for referring your patient, Kelin Guerra, to the [facility] RADIATION ONCOLOGY [place]. Please see a copy of my visit note below.         Department of Radiation Oncology  [facility]  [facility]  [address] [address] [place] [address]  [phone]       Radiation Oncology Follow-up Visit  2022      Kelin Guerra  MRN: [id number]   : 1970     DIAGNOSIS / ID: Ms. 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 Patel, 22, re-excision for positive margin 22) with pathology demonstrating 8mm DCIS, grade 2, negative margins (initially had positive superior medial margin, and close 1mm margin posteriorly with no residual on re-excision). She will receive endocrine therapy after radiation (Dr. Robledo). Plan for 40Gy/15fx without boost.       INTENT OF RADIOTHERAPY: Adjuvant     CONCURRENT SYSTEMIC THERAPY: No              SITE OF TREATMENT:  Right breast     DATES  OF TREATMENT:11/3/22- 22     TOTAL DOSE OF TREATMENT / FRACTIONS: 40.05Gy/15fx    INTERVAL SINCE COMPLETION OF RADIATION THERAPY: 1 month    SUBJECTIVE:    Overall, patient is doing well since completing radiation. Denies any chest pain, chest tenderness, range of motion difficulties, or signs of arm/hand swelling. She is on endocrine therapy per Dr. Robledo and is tolerating this well.       PHYSICAL EXAM:  /78 (BP Location: Left arm, Patient Position: Chair, Cuff Size: Adult Large)   Pulse 89   Temp 98.8  F (37.1  C) (Oral)   Resp 18   Wt 83 kg (183 lb)   SpO2 97%   Gen: Alert, in NAD  Eyes: PERRL, EOMI, sclera anicteric  HENT     Head: NC/AT     Ears: No external auricular lesions     Nose/sinus: No rhinorrhea or epistaxis     Oral Cavity/Oropharynx: MMM  Neck: Supple, full  ROM, no LAD  Breast: right breast healing well, incisions CDI, no skin breakdown   Pulm: No wheezing, stridor or respiratory distress  CV: Well-perfused, no cyanosis, no pedal edema  Abdominal: Soft, nontender, nondistended, no hepatomegaly  Back: No step-offs or pain to palpation along the thoracolumbar spine, no CVA tenderness  Musculoskeletal: Normal bulk and tone   Skin: Normal color and turgor  Neurologic: A/Ox3, CN II-XII intact  Psychiatric: Appropriate mood and affect    LABS AND IMAGING:  Reviewed.    IMPRESSION:   Patient is doing well from a radiation toxicity perspective and has healed well since completing radiation.    PLAN:   1. Follow up with radiation oncology on an as needed basis  2. Follow up with medical oncology for endocrine therapy, mammograms per medical oncology (Dr. Robledo)    Tulio Eller M.D.  Department of Radiation Oncology  [facility]             Again, thank you for allowing me to participate in the care of your patient.        Sincerely,        Tulio Eller MD

## 2022-12-23 NOTE — NURSING NOTE
RADIATION ONCOLOGY BREAST FOLLOW-UP VISIT    Patient Name: Kelin Guerra      : 1970     Age: 52 year old        ______________________________________________________________________________       Chief Complaint   Patient presents with     Cancer     Radiation oncology return visit with Dr. Eller     /78 (BP Location: Left arm, Patient Position: Chair, Cuff Size: Adult Large)   Pulse 89   Temp 98.8  F (37.1  C) (Oral)   Resp 18   Wt 83 kg (183 lb)   SpO2 97%       History of Radiation Treatment:  Site: right breast  Total Dose: 4,005 cGy  Date Completed: 2022  Clinic: [facility]  Physician: Dr. Tulio Eller      Pain:  Denies    Labs:  Other Labs: No    Imaging:  None    Fatigue:   Grade 0: No toxicity    Skin:  No Concerns  Lotions/Creams: patient reports using Aveeno lotion    Range of Motion:   No Concerns    Lymphedema:  No Concerns  Referral Needed: No      Medical Oncologist: Dr. Robledo    Endocrine Therapy: patient reports taking Tamoxifen po as prescribed      Future Appointments:      Dr. Robledo Appointment Date: 2023    Appointment Date:     Appointment Date:             Nurse face-to-face time: Level 5:  over 15 min face to face time.    Swetha Cha, RN BSN OCN CBCN

## 2022-12-25 NOTE — PROGRESS NOTES
Radiotherapy Treatment Summary              PATIENT: Kelin Guerra  MEDICAL RECORD NO: [id number]   : 1970    DIAGNOSIS / ID: Ms. 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 Patel, 22, re-excision for positive margin 22) with pathology demonstrating 8mm DCIS, grade 2, negative margins (initially had positive superior medial margin, and close 1mm margin posteriorly with no residual on re-excision). She will receive endocrine therapy after radiation (Dr. Robledo). Plan for 40Gy/15fx without boost.       INTENT OF RADIOTHERAPY: Adjuvant    CONCURRENT SYSTEMIC THERAPY: No             SITE OF TREATMENT:  Right breast    DATES  OF TREATMENT:11/3/22- 22    TOTAL DOSE OF TREATMENT / FRACTIONS: 40.05Gy/15fx    FOLLOW UP PLAN:  1. Follow up with Radiation Oncology in 1 month  2. Follow up with Dr. Robledo for endocrine therapy     CC  Patient Care Team:  Erin Banegas MD as PCP - General (Internal Medicine - Pediatrics)  Ita Robledo MD as MD (Hematology & Oncology)  Phoebe Patel (Surgery)  Tulio Eller MD as MD (Radiation Oncology)  Swetha Conner, RN as Specialty Care Coordinator (Radiation Oncology)  Tulio Eller MD as Assigned Cancer Care Provider       Tulio Eller M.D.  Department of Radiation Oncology  [facility]

## 2022-12-25 NOTE — PROGRESS NOTES
Department of Radiation Oncology  [facility]    Health: Cancer Center  [facility]  [address] [place] [address] [phone]       Radiation Oncology Follow-up Visit  2022      Kelin Guerra  MRN: [id number]   : 1970     DIAGNOSIS / ID: Ms. 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 Patel, 22, re-excision for positive margin 22) with pathology demonstrating 8mm DCIS, grade 2, negative margins (initially had positive superior medial margin, and close 1mm margin posteriorly with no residual on re-excision). She will receive endocrine therapy after radiation (Dr. Robledo). Plan for 40Gy/15fx without boost.       INTENT OF RADIOTHERAPY: Adjuvant     CONCURRENT SYSTEMIC THERAPY: No              SITE OF TREATMENT:  Right breast     DATES  OF TREATMENT:11/3/22- 22     TOTAL DOSE OF TREATMENT / FRACTIONS: 40.05Gy/15fx    INTERVAL SINCE COMPLETION OF RADIATION THERAPY: 1 month    SUBJECTIVE:    Overall, patient is doing well since completing radiation. Denies any chest pain, chest tenderness, range of motion difficulties, or signs of arm/hand swelling. She is on endocrine therapy per Dr. Robledo and is tolerating this well.       PHYSICAL EXAM:  /78 (BP Location: Left arm, Patient Position: Chair, Cuff Size: Adult Large)   Pulse 89   Temp 98.8  F (37.1  C) (Oral)   Resp 18   Wt 83 kg (183 lb)   SpO2 97%   Gen: Alert, in NAD  Eyes: PERRL, EOMI, sclera anicteric  HENT     Head: NC/AT     Ears: No external auricular lesions     Nose/sinus: No rhinorrhea or epistaxis     Oral Cavity/Oropharynx: MMM  Neck: Supple, full ROM, no LAD  Breast: right breast healing well, incisions CDI, no skin breakdown   Pulm: No wheezing, stridor or respiratory distress  CV: Well-perfused, no cyanosis, no pedal edema  Abdominal: Soft, nontender, nondistended, no hepatomegaly  Back: No step-offs or pain  to palpation along the thoracolumbar spine, no CVA tenderness  Musculoskeletal: Normal bulk and tone   Skin: Normal color and turgor  Neurologic: A/Ox3, CN II-XII intact  Psychiatric: Appropriate mood and affect    LABS AND IMAGING:  Reviewed.    IMPRESSION:   Patient is doing well from a radiation toxicity perspective and has healed well since completing radiation.    PLAN:   1. Follow up with radiation oncology on an as needed basis  2. Follow up with medical oncology for endocrine therapy, mammograms per medical oncology (Dr. Robledo)    Tulio Eller M.D.  Department of Radiation Oncology  [facility]

## 2023-02-12 ENCOUNTER — HEALTH MAINTENANCE LETTER (OUTPATIENT)
Age: 53
End: 2023-02-12

## 2023-12-31 ENCOUNTER — HEALTH MAINTENANCE LETTER (OUTPATIENT)
Age: 53
End: 2023-12-31

## 2024-03-10 ENCOUNTER — HEALTH MAINTENANCE LETTER (OUTPATIENT)
Age: 54
End: 2024-03-10

## 2024-04-17 ENCOUNTER — OFFICE VISIT (OUTPATIENT)
Dept: RADIATION ONCOLOGY | Facility: CLINIC | Age: 54
End: 2024-04-17
Payer: COMMERCIAL

## 2024-04-17 VITALS
RESPIRATION RATE: 18 BRPM | OXYGEN SATURATION: 99 % | DIASTOLIC BLOOD PRESSURE: 90 MMHG | HEART RATE: 84 BPM | TEMPERATURE: 98.2 F | WEIGHT: 165 LBS | SYSTOLIC BLOOD PRESSURE: 131 MMHG

## 2024-04-17 DIAGNOSIS — D05.11 DUCTAL CARCINOMA IN SITU (DCIS) OF RIGHT BREAST: Primary | ICD-10-CM

## 2024-04-17 PROCEDURE — G2211 COMPLEX E/M VISIT ADD ON: HCPCS | Performed by: SURGERY

## 2024-04-17 PROCEDURE — 99215 OFFICE O/P EST HI 40 MIN: CPT | Performed by: SURGERY

## 2024-04-17 ASSESSMENT — PAIN SCALES - GENERAL: PAINLEVEL: NO PAIN (0)

## 2024-04-17 NOTE — NURSING NOTE
"Oncology Rooming Note    April 17, 2024 8:18 AM   Kelin Guerra is a 53 year old female who presents for:    Chief Complaint   Patient presents with    Cancer     Radiation oncology return visit with Dr. Eller     Initial Vitals: BP (!) 131/90 (BP Location: Left arm, Patient Position: Chair, Cuff Size: Adult Regular)   Pulse 84   Temp 98.2  F (36.8  C) (Oral)   Resp 18   Wt 74.8 kg (165 lb)   SpO2 99%  There is no height or weight on file to calculate BMI. There is no height or weight on file to calculate BSA.  No Pain (0) Comment: Data Unavailable   No LMP recorded. Patient has had a hysterectomy.  Allergies reviewed: Yes  Medications reviewed: Yes    Medications: Medication refills not needed today.  Pharmacy name entered into EPIC: Data Unavailable    Frailty Screening:   Is the patient here for a new oncology consult visit in cancer care? 2. No      Clinical concerns: patient presents to clinic for visit with Dr. Eller to discuss right lateral rib pain.  Patient describes pain as intermittent and reports \"sometimes it catches or feels a pull\", denies need for pain medication, worse with stretching of right arm overhead to left side.  Patient reports varying workouts between weight lifting, cardio and stretching/yoga seven days per week.  Patient currently taking Tamoxifen as prescribed by Dr. Robledo, most recent mammogram on 8/9/2023.    History of Radiation Therapy:  Site: Right Breast  Dose: 4,005 cGy  Completed on 11/25/2022  Dr. Tulio Eller was notified.      Swetha Cha, RN BSN OCN CBCN                "

## 2024-04-17 NOTE — PATIENT INSTRUCTIONS
Please contact [facility] RN with questions or concerns following today's appointment: [phone].       Please feel free to leave a detailed message if your call is not answered.    If your call is not received before 3:00 PM, it may not be returned until the following business day.    If you are receiving radiation treatment and need assistance after 3:00 PM or on the weekends, please call [phone] and ask to speak to the radiation oncologist on-call.    Thank you!    Swetha RAMSO

## 2024-04-17 NOTE — PROGRESS NOTES
Department of Radiation Oncology  [facility]: Cancer Center  [facility]  [address] [place] [address] [phone]       Radiation Oncology Follow-up Visit  24        Kelin Guerra  MRN: [id number]   : 1970     DIAGNOSIS / ID: Ms. Guerra is a 53 year old female with RIGHT Stage 0 ER+ DCIS s/p BCS (Dr. Patel, 22, re-excision for positive margin 22) with pathology demonstrating 8mm DCIS, grade 2, negative margins (initially had positive superior medial margin, and close 1mm margin posteriorly with no residual on re-excision). She will receive endocrine therapy after radiation (Dr. Robledo). Plan for 40Gy/15fx without boost.       INTENT OF RADIOTHERAPY: Adjuvant     CONCURRENT SYSTEMIC THERAPY: No              SITE OF TREATMENT:  Right breast     DATES  OF TREATMENT:11/3/22- 22     TOTAL DOSE OF TREATMENT / FRACTIONS: 40.05Gy/15fx    INTERVAL SINCE COMPLETION OF RADIATION THERAPY: approx. 15 months    SUBJECTIVE:    Patient completed radiation to the right breast approximately 18 months ago.  She states that she tolerated radiation fairly well without many side effects.  She has recently started to exercise more frequently and has lost over 25 pounds intentionally.  Nearly a year after completion of radiation, she has noticed a right lateral chest wall tugging sensation, nonpainful, does not feel anything at rest, no associated swelling, no drainage, no fevers, no lumps or bumps.  Primarily, she experiences when she bends to the contralateral side.  However she presents today given that this is not improved over the last few months.    She is been under the care of Dr. Robledo and has been tolerating endocrine therapy, although she is having follow-up flashes and using magnesium with relief.  Her last mammogram was in 2023, which is negative.  She is pending to see Dr. Robledo in 2024  "after mammogram.    PHYSICAL EXAM:  BP (!) 131/90 (BP Location: Left arm, Patient Position: Chair, Cuff Size: Adult Regular)   Pulse 84   Temp 98.2  F (36.8  C) (Oral)   Resp 18   Wt 74.8 kg (165 lb)   SpO2 99%   Gen: Alert, in NAD  Eyes: PERRL, EOMI, sclera anicteric  HENT     Head: NC/AT     Ears: No external auricular lesions     Nose/sinus: No rhinorrhea or epistaxis     Oral Cavity/Oropharynx: MMM  Neck: Supple, full ROM, no LAD  Breast: right breast healing well, incisions CDI, no skin breakdown   Pulm: No wheezing, stridor or respiratory distress  CV: Well-perfused, no cyanosis, no pedal edema  Abdominal: Soft, nontender, nondistended, no hepatomegaly  Back: No step-offs or pain to palpation along the thoracolumbar spine, no CVA tenderness  Musculoskeletal: Normal bulk and tone  right chest wall \"tightness\" when fully extended to contralateral side, questionable scarring in musculature, no evidence of any adenopathy in the axilla  Skin: Normal color and turgor  Neurologic: A/Ox3, CN II-XII intact  Psychiatric: Appropriate mood and affect    LABS AND IMAGING:  Reviewed, per HPI and in agreement    I have reviewed Dr. Robledo's notes as well as her mammograms     IMPRESSION:   Overall, patient has healed fairly well from a radiation perspective.  Clinically, I do not see any signs of a local regional recurrence.  Further there are no signs of any lymphedema.  She does however have some tightening in the right lateral chest wall, which may be attributed to scarring and fibrosis.    PLAN:   Aortic to fully evaluate, I discussed the possibility of ordering a CT scan at this point versus a referral to physical therapy.  At this point patient prefers a referral to physical therapy, which I think is very reasonable.  She will have a mammogram in July 2024 and see Dr. Robledo in August 2024.  I will have patient return in 3 to 4 months after physical therapy and appointment with medical oncology, and if " patient has worsening symptoms, we will consider a CT scan at that point.    Tulio Eller M.D.  Department of Radiation Oncology  [facility]     A total of 40 minutes were spent on this visit, including preparation for this visit, face to face with patient, and medical decision making. Medical decision-making included consideration and possible diagnosis, management options, complex record review, review of diagnostic tests, consideration and discussion of significant complications based up medical history/comorbidities, and discussion with providers involved in care of the patient.

## 2024-04-17 NOTE — LETTER
2024         RE: Kelin Guerra  [address] [facility] [address]        Dear Colleague,    Thank you for referring your patient, Kelin Guerra, to the [facility] RADIATION ONCOLOGY [place]. Please see a copy of my visit note below.         Department of Radiation Oncology  [facility]: Cancer Center  [facility]  [address] [address] [place] [id number]  [phone]       Radiation Oncology Follow-up Visit  24        Kelin Guerra  MRN: [id number]   : 1970     DIAGNOSIS / ID: Ms. Guerra is a 53 year old female with RIGHT Stage 0 ER+ DCIS s/p BCS (Dr. Patel, 22, re-excision for positive margin 22) with pathology demonstrating 8mm DCIS, grade 2, negative margins (initially had positive superior medial margin, and close 1mm margin posteriorly with no residual on re-excision). She will receive endocrine therapy after radiation (Dr. Robledo). Plan for 40Gy/15fx without boost.       INTENT OF RADIOTHERAPY: Adjuvant     CONCURRENT SYSTEMIC THERAPY: No              SITE OF TREATMENT:  Right breast     DATES  OF TREATMENT:11/3/22- 22     TOTAL DOSE OF TREATMENT / FRACTIONS: 40.05Gy/15fx    INTERVAL SINCE COMPLETION OF RADIATION THERAPY: approx. 15 months    SUBJECTIVE:    Patient completed radiation to the right breast approximately 18 months ago.  She states that she tolerated radiation fairly well without many side effects.  She has recently started to exercise more frequently and has lost over 25 pounds intentionally.  Nearly a year after completion of radiation, she has noticed a right lateral chest wall tugging sensation, nonpainful, does not feel anything at rest, no associated swelling, no drainage, no fevers, no lumps or bumps.  Primarily, she experiences when she bends to the contralateral side.  However she presents today given that this is not improved over the last few months.    She is been  "under the care of Dr. Robledo and has been tolerating endocrine therapy, although she is having follow-up flashes and using magnesium with relief.  Her last mammogram was in August 2023, which is negative.  She is pending to see Dr. Robledo in August 2024 after mammogram.    PHYSICAL EXAM:  BP (!) 131/90 (BP Location: Left arm, Patient Position: Chair, Cuff Size: Adult Regular)   Pulse 84   Temp 98.2  F (36.8  C) (Oral)   Resp 18   Wt 74.8 kg (165 lb)   SpO2 99%   Gen: Alert, in NAD  Eyes: PERRL, EOMI, sclera anicteric  HENT     Head: NC/AT     Ears: No external auricular lesions     Nose/sinus: No rhinorrhea or epistaxis     Oral Cavity/Oropharynx: MMM  Neck: Supple, full ROM, no LAD  Breast: right breast healing well, incisions CDI, no skin breakdown   Pulm: No wheezing, stridor or respiratory distress  CV: Well-perfused, no cyanosis, no pedal edema  Abdominal: Soft, nontender, nondistended, no hepatomegaly  Back: No step-offs or pain to palpation along the thoracolumbar spine, no CVA tenderness  Musculoskeletal: Normal bulk and tone  right chest wall \"tightness\" when fully extended to contralateral side, questionable scarring in musculature, no evidence of any adenopathy in the axilla  Skin: Normal color and turgor  Neurologic: A/Ox3, CN II-XII intact  Psychiatric: Appropriate mood and affect    LABS AND IMAGING:  Reviewed, per HPI and in agreement    I have reviewed Dr. Robledo's notes as well as her mammograms     IMPRESSION:   Overall, patient has healed fairly well from a radiation perspective.  Clinically, I do not see any signs of a local regional recurrence.  Further there are no signs of any lymphedema.  She does however have some tightening in the right lateral chest wall, which may be attributed to scarring and fibrosis.    PLAN:   Aortic to fully evaluate, I discussed the possibility of ordering a CT scan at this point versus a referral to physical therapy.  At this point patient " prefers a referral to physical therapy, which I think is very reasonable.  She will have a mammogram in July 2024 and see Dr. Robledo in August 2024.  I will have patient return in 3 to 4 months after physical therapy and appointment with medical oncology, and if patient has worsening symptoms, we will consider a CT scan at that point.    Tulio Eller M.D.  Department of Radiation Oncology  [facility]     A total of 40 minutes were spent on this visit, including preparation for this visit, face to face with patient, and medical decision making. Medical decision-making included consideration and possible diagnosis, management options, complex record review, review of diagnostic tests, consideration and discussion of significant complications based up medical history/comorbidities, and discussion with providers involved in care of the patient.       Again, thank you for allowing me to participate in the care of your patient.        Sincerely,        Tulio Eller MD

## 2024-05-22 ENCOUNTER — THERAPY VISIT (OUTPATIENT)
Dept: OCCUPATIONAL THERAPY | Facility: CLINIC | Age: 54
End: 2024-05-22
Attending: SURGERY
Payer: COMMERCIAL

## 2024-05-22 DIAGNOSIS — D05.11 DUCTAL CARCINOMA IN SITU (DCIS) OF RIGHT BREAST: ICD-10-CM

## 2024-05-22 PROCEDURE — 97165 OT EVAL LOW COMPLEX 30 MIN: CPT | Mod: GO

## 2024-05-22 PROCEDURE — 97140 MANUAL THERAPY 1/> REGIONS: CPT | Mod: GO

## 2024-05-22 NOTE — PROGRESS NOTES
"OCCUPATIONAL THERAPY EVALUATION  Type of Visit: Evaluation    See electronic medical record for Abuse and Falls Screening details.    Subjective      Presenting condition or subjective complaint: discomfort on right side over rib cage  Date of onset:  4/17/2024 (order date)    Relevant medical history:     Dates & types of surgery: 9/9 and 9/30 lumpectomy DCIS breasy cancer    Prior diagnostic imaging/testing results:       Prior therapy history for the same diagnosis, illness or injury:    no    Prior Level of Function  Transfers: Independent  Ambulation: Independent  ADL: Independent  IADL:  indep    Living Environment  Social support: With a significant other or spouse   Type of home: House   Stairs to enter the home:         Ramp: No   Stairs inside the home: Yes 6 Is there a railing: Yes   Help at home: None  Equipment owned:       Employment:    works full time  Hobbies/Interests:      Patient goals for therapy: continue workouts and stretching without periodic discomfort    Pain assessment:  tugging/tightness with certain movements     Cognitive Status Examination  Orientation: Oriented to person, place and time   Level of Consciousness: Alert  Follows Commands and Answers Questions: 100% of the time  Personal Safety and Judgement: Intact  Memory: Intact    EDEMA  Skin Condition: Intact, area of adhesion on inferior border of R breast, when pulled taut a vertical \"cord\" is visible  Scar: Yes  Lumpectomy scar well healed with no adhesion    GIRTH MEASUREMENTS: Refer to separate girth measurement flowsheet.     VOLUME UE  Right UE (mL) 1768   Left UE (mL) 1799   UE Volume Comparison    % Difference      RANGE OF MOTION: UE ROM WNL  STRENGTH: UE Strength WNL  PALPATION: no tenderness with palpation in the area pt has noticed pain, able to recreate the pain/tugging feeling pulling tissue down  ACTIVITIES OF DAILY LIVING: indep  BED MOBILITY: Independent  TRANSFERS: Independent  GAIT/LOCOMOTION: indep with no " device  BALANCE: WNL  SENSATION: UE Sensation WNL    Assessment & Plan   CLINICAL IMPRESSIONS  Medical Diagnosis:      Treatment Diagnosis:      Impression/Assessment: Pt is a 53 year old female presenting to Occupational Therapy due to pain/tugging in RUQ after radiation.  The following significant findings have been identified: Pain.  These identified deficits interfere with their ability to perform  exercising  as compared to previous level of function.     Clinical Decision Making (Complexity):  Assessment of Occupational Performance: 1-3 Performance Deficits  Occupational Performance Limitations: leisure activities  Clinical Decision Making (Complexity): Low complexity    PLAN OF CARE  Treatment Interventions:  Interventions: Manual Therapy    Long Term Goals    Education  Pt will verbalize understanding of education on lymphedema management/prevention, HEP and signs of progression and when to return to clinic.   In order to maximize safety and independence with performance of self-care activities;In order to maximize safety and independence with ADL/IADLs;   Target date: 5/22/2024  Date met: 5/22/2024      Frequency of Treatment:  Eval + 1 treatment only needed at this time  Duration of Treatment:       Recommended Referrals to Other Professionals: none at this time  Education Assessment:  Patient;Listening;Reading;No Barriers to Learning;Demonstration;      Risks and benefits of evaluation/treatment have been explained.   Patient/Family/caregiver agrees with Plan of Care.     Evaluation Time: OT Eval, Low Complexity Minutes 15      Signing Clinician: Susy Porras OT

## 2024-08-01 ENCOUNTER — MYC MEDICAL ADVICE (OUTPATIENT)
Dept: RADIATION ONCOLOGY | Facility: CLINIC | Age: 54
End: 2024-08-01
Payer: COMMERCIAL

## 2024-08-01 NOTE — TELEPHONE ENCOUNTER
Dr. Eller notified regarding contact with patient via [employer] and patient decision to not schedule return visit at this time.  Scheduling updated.    Swetha Cha, RN BSN OCN CBCN

## 2025-03-16 ENCOUNTER — HEALTH MAINTENANCE LETTER (OUTPATIENT)
Age: 55
End: 2025-03-16